# Patient Record
Sex: FEMALE | URBAN - METROPOLITAN AREA
[De-identification: names, ages, dates, MRNs, and addresses within clinical notes are randomized per-mention and may not be internally consistent; named-entity substitution may affect disease eponyms.]

---

## 2017-03-02 ENCOUNTER — IMPORTED ENCOUNTER (OUTPATIENT)
Dept: URBAN - METROPOLITAN AREA CLINIC 1 | Facility: CLINIC | Age: 57
End: 2017-03-02

## 2017-03-02 PROBLEM — H52.13: Noted: 2017-03-02

## 2017-03-02 PROCEDURE — S0621 ROUTINE OPHTHALMOLOGICAL EXA: HCPCS

## 2017-03-02 NOTE — PATIENT DISCUSSION
1. Myopia OU- Rx for glasses and CLs given 2. Glaucoma Suspect OU (CD 0.75/0.8) Past w/u negative. Return for OCT. 3. Cats OU- observe Return for an appointment in 4-6m 30 OCT with Dr. Adolfo Rodríguez.

## 2017-05-16 ENCOUNTER — OFFICE VISIT (OUTPATIENT)
Dept: VASCULAR SURGERY | Age: 57
End: 2017-05-16

## 2017-05-16 VITALS
DIASTOLIC BLOOD PRESSURE: 80 MMHG | HEIGHT: 70 IN | HEART RATE: 88 BPM | SYSTOLIC BLOOD PRESSURE: 144 MMHG | RESPIRATION RATE: 20 BRPM | BODY MASS INDEX: 40.94 KG/M2 | WEIGHT: 286 LBS

## 2017-05-16 DIAGNOSIS — I65.22 CAROTID STENOSIS, ASYMPTOMATIC, LEFT: Primary | ICD-10-CM

## 2017-05-16 DIAGNOSIS — I83.893 VARICOSE VEINS OF LOWER EXTREMITIES WITH OTHER COMPLICATIONS: ICD-10-CM

## 2017-05-17 NOTE — PROGRESS NOTES
Lino Young    Chief Complaint   Patient presents with    New Patient    Leg Pain       History and Physical    Ms Diamond Padilla is here not having been seen by our practice for nearly 7-8 years  But previously, she had bilateral GSV ablations and then scleortherapy done with us and had great results  But last week she started feeling pain in her right thigh area, both medial and lateral. There is local swelling and burning sensations as well  When she paid attention to these areas she noticed varicose veins at these locations. She is not sure if they had already been there - if so, they hadn't been bothersome.  But now noticing them and the associated symptoms, she wanted to be re-evaluated by us    I did explain that even though she has had prior treatments, vein problems can recur in other veins      Past Medical History:   Diagnosis Date    Anemia     Arthritis     Baker cyst     left    Carpal tunnel syndrome     left    Cholelithiasis     Chronic pain     Chronic tension headaches     Elevated cholesterol     Fatty liver     Fibromyalgia     Hemorrhoids     Hypothyroid     Insulin resistance     Liver disease     Remote tobacco use     quit 2010    Sleep apnea     Temporomandibular joint disorder     Thyroid disease     Varicose veins      Patient Active Problem List   Diagnosis Code    Insulin resistance E88.81    Dyslipidemia E78.5    Cardiovascular risk assessment Z91.89    Morbid obesity with BMI of 40.0-44.9, adult ([address] E66.01, Z68.41    Acquired hypothyroidism E03.9    Vitamin D deficiency E55.9    Cholelithiasis K80.20     Past Surgical History:   Procedure Laterality Date    HX CARPAL TUNNEL RELEASE  12-18-15    left    HX DILATION AND CURETTAGE      HX HYSTERECTOMY      2001    HX SALPINGO-OOPHORECTOMY      2012    HX THYROIDECTOMY      2002    HX TONSILLECTOMY      1992    HX TUBAL LIGATION      HX WISDOM TEETH EXTRACTION      x4     Current Outpatient Prescriptions Medication Sig Dispense Refill    Cholecalciferol, Vitamin D3, 5,000 unit/mL drop Take 5,000 Units by mouth daily. Allergies   Allergen Reactions    Propoxyphene-Acetaminophen Other (comments)     Pt felt \"loopy\" and skin crawlingn (\"wygesic\")    Gluten Other (comments)    Lactose Other (comments)     Social History     Social History    Marital status: SINGLE     Spouse name: N/A    Number of children: N/A    Years of education: N/A     Occupational History    Not on file.      Social History Main Topics    Smoking status: Former Smoker     Packs/day: 1.00     Years: 10.00     Types: Cigarettes     Quit date: 12/31/2011    Smokeless tobacco: Never Used    Alcohol use 0.0 oz/week     0 Standard drinks or equivalent per week      Comment: social    Drug use: Yes     Special: Prescription, OTC    Sexual activity: Not on file     Other Topics Concern    Not on file     Social History Narrative      Family History   Problem Relation Age of Onset    Cancer Mother      breast,     Heart Disease Father     Hypertension Father     Elevated Lipids Father     Kidney Disease Brother     Obesity Brother     Arthritis-rheumatoid Maternal Grandmother     Cancer Maternal Grandmother      colon    No Known Problems Maternal Grandfather     No Known Problems Paternal Grandmother     Heart Disease Paternal Grandfather     Heart Attack Paternal Grandfather     No Known Problems Brother     No Known Problems Son     Heart Disease Other     Hypertension Other     Thyroid Disease Other     Diabetes Other        Review of Systems    Review of Systems - History obtained from the patient  General ROS: negative  Psychological ROS: negative  Ophthalmic ROS: negative  Respiratory ROS: negative  Cardiovascular ROS: negative  Gastrointestinal ROS: negative  Musculoskeletal ROS: negative  Neurological ROS: negative  Dermatological ROS: negative  Vascular ROS: per HPI        Physical Exam:    Visit Vitals    /80 (BP 1 Location: Left arm, BP Patient Position: Sitting)    Pulse 88    Resp 20    Ht 5' 10\" (1.778 m)    Wt 286 lb (129.7 kg)    BMI 41.04 kg/m2      General:  Alert, cooperative, no distress. Head:  Normocephalic, without obvious abnormality, atraumatic. Eyes:    Conjunctivae/corneas clear. Pupils equal, round, reactive to light. Extraocular movements intact. Neck:         No bruits   Lungs:   Clear to auscultation bilaterally. Heart:  Regular rate and rhythm, S1, S2 normal   Extremities: No significant edema noted. But right upper medial and lateral thigh with approx 3mm ropey varicosities, nonphlebitic. Also scattered spider veins in these areas. Pulses: 2+ and symmetric all extremities. Skin: Skin color, texture, turgor normal. No rashes or lesions. Impression and Plan:  1. Carotid stenosis, asymptomatic, left    2. Varicose veins of lower extremities with other complications      Orders Placed This Encounter    DUPLEX LOWER EXT VENOUS RIGHT AMB (Reflux)    DUPLEX CAROTID BILATERAL AMB     Will get repeat reflux study to follow these veins for any other ablation options. But I do wonder most likely that we may offer further sclerotherapy, or perhaps a newer surgical procedure, trivex  Meanwhile, I reminded her to make and effort to start wearing her compression stockings again. She may also try using topical preparation H or horse chestnut seed extract for symptom relief    She also mentions a few years ago getting a life screen and told she had some blockage in one of her carotid arteries. She is concerned because of her family history of circulation problems. We can go ahead and repeat a carotid duplex as well.   We will contact her with results and recommendations for follow up/treatment options to offer based on reflux study  Focus is currently on right leg, as left is asymptomatic    KEVYN Enriquez    Portions of this note have been created using voice recognition software.

## 2017-05-23 ENCOUNTER — OFFICE VISIT (OUTPATIENT)
Dept: VASCULAR SURGERY | Age: 57
End: 2017-05-23

## 2017-05-23 DIAGNOSIS — I65.22 CAROTID STENOSIS, ASYMPTOMATIC, LEFT: ICD-10-CM

## 2017-05-23 DIAGNOSIS — I83.893 VARICOSE VEINS OF LOWER EXTREMITIES WITH OTHER COMPLICATIONS: ICD-10-CM

## 2017-05-23 NOTE — PROCEDURES
[facility] Vein   *** FINAL REPORT ***    Name: Lela Mcgill  MRN: [id number]       Outpatient  : 1960  HIS Order #: [id number]  [id number] [facility] Visit #: [id number]  Date: 23 May 2017    TYPE OF TEST: Cerebrovascular Duplex    REASON FOR TEST  Carotid disease    Right Carotid:-             Proximal               Mid                 Distal  cm/s  Systolic  Diastolic  Systolic  Diastolic  Systolic  Diastolic  CCA:     23.4      22.0                            83.0      22.0  Bulb:  ECA:     97.0      16.0  ICA:     79.0      17.0       86.0      29.0       69.0      23.0  ICA/CCA:  1.0       1.3    ICA Stenosis: Normal    Right Vertebral:-  Finding: Antegrade  Sys:       36.0  Oriana:       13.0    Right Subclavian:    Left Carotid:-            Proximal                Mid                 Distal  cm/s  Systolic  Diastolic  Systolic  Diastolic  Systolic  Diastolic  CCA:    397.0      33.0                            82.0      27.0  Bulb:  ECA:     68.0      16.0  ICA:     53.0      16.0       64.0      26.0       67.0      26.0  ICA/CCA:  0.6       0.8    ICA Stenosis: <50%    Left Vertebral:-  Finding: Antegrade  Sys:       40.0  Oriana:        9.0    Left Subclavian:    INTERPRETATION/FINDINGS  Duplex images were obtained using 2-D gray scale, color flow and  spectral doppler analysis. 1. No significant stenosis of the right internal carotid artery. 2. Mild <50% stenosis in the left internal carotid artery. 3. No significant stenosis in the external carotid arteries  bilaterally. 4. Antegrade flow in both vertebral arteries. Plaque Morphology:  Heterogeneous plaque in the bulb and left ICA. No prior to compare. ADDITIONAL COMMENTS    I have personally reviewed the data relevant to the interpretation of  this  study. TECHNOLOGIST: Savita Smith RDMS  Signed: 2017 11:19 AM    PHYSICIAN: Jeffrey Phillips D.O.   Signed: 2017 02:10 PM

## 2017-05-23 NOTE — PROCEDURES
[facility] Vein   *** FINAL REPORT ***    Name: Mildred Macias  MRN: [id number]       Outpatient  : 1960  HIS Order #: [id number]  [id number] [facility] Visit #: [id number]  Date: 23 May 2017    TYPE OF TEST: Peripheral Venous Testing    REASON FOR TEST  Varicose veins, Limb Pain    Right Leg:-  Deep venous thrombosis:           No  Superficial venous thrombosis:    Not examined  Deep venous insufficiency:        Yes  Superficial venous insufficiency: Yes    Abnormal Valve Closure Times (seconds):    Right Common Femoral: 0.9    Right SFJ:            0.6    Right GSV proximal:   3.5    Right GSV distal:     5.0    Vein Mapping:    Diam.   Depth  (mm)    Right Great Saphenous Vein:    High Thigh:    Mid Thigh:    Low Thigh:    Knee:    High Calf:    Low Calf: Ankle:    Right Small Saphenous Vein:    SPJ:    Mid Calf: Ankle:    Giacomini:  Accessory saph.:  Schaffer :  Grace :      INTERPRETATION/FINDINGS  Duplex images were obtained using 2-D gray scale, color flow and  spectral doppler analysis. Right leg Reflux Exam:  1. No evidence of deep venous thrombosis detected in the common  femoral, femoral, deep femoral, popliteal, posterior tibial or  peroneal veins visualized. 2. Incompetent deep venous system with reflux involving the common  femoral vein only. 3. Incompetent great saphenous vein with reflux in the sapheno-femoral   junction, high upper thigh before the vein obliterates from known  history of ablation in  in the mid to distal thigh, Classification   T0.  4. The GSV is patent at the above knee level with reflux at the above  knee, below knee and calf levels. 5. Incompetent tributary off the GSV measuring 4.3mm with 4.1 seconds  of reflux at the above knee level with multiple varices noted. 6. Small saphenous vein is patent and competent without evidence of  thrombus or reflux. 7. Patent contralateral femoral vein with venous insufficiency  measuring 2.2 seconds.  The GSV at the [facility] is patent without reflux. 8. Multiphasic tibial doppler signal on the right at rest.    ADDITIONAL COMMENTS    I have personally reviewed the data relevant to the interpretation of  this  study. TECHNOLOGIST: Stephanie Carter RDMS  Signed: 05/23/2017 11:31 AM    PHYSICIAN: Radha Hernandez D.O.   Signed: 05/23/2017 02:10 PM

## 2017-05-24 ENCOUNTER — DOCUMENTATION ONLY (OUTPATIENT)
Dept: VASCULAR SURGERY | Age: 57
End: 2017-05-24

## 2017-05-24 NOTE — PROGRESS NOTES
Called pt with results of studies  She had screening carotid a few years ago with noted blockage. We repeated and she does have some mild plaque of LICA but no significant stenosis. DIVINA HERNANDEZ    She has had bilateral GSV ablations and then scleortherapy done with us and had great results previously  But she recently started feeling pain in her right thigh area, both medial and lateral. There is local swelling and burning sensations as well  This correlates to appearance of varicose veins at these locations    Vein study showed incompetent great saphenous vein with reflux in the sapheno-femoral junction, high upper thigh before the vein obliterates from known history of ablation in 2009 in the mid to distal thigh. The GSV is patent at the above knee level with reflux at the above knee, below knee and calf levels. Incompetent tributary off the GSV measuring 4.3mm with 4.1 seconds  of reflux at the above knee level with multiple varices noted    This corresponds to her exam as well  Of note, she had stockings from prior procedure. She attempted to wear. However, due to size of her legs, stockings ineffective in compressing areas of concern and actually aggravating rather than helping relieve symptoms. She has also taken NSAIDs intermittently, but minimal relief of discomfort and it does not help the swelling and burning    I reviewed the results/worksheet with Dr Yina Kahn prior to calling patient  He advised, and we had even discussed this at her visit, about doing sclerotherapy. I again reviewed the procedure for injections and she would like to proceed. We will coordinate making arrangements to have her come in.  We may need to plan for 2-3 sessions

## 2017-05-30 ENCOUNTER — TELEPHONE (OUTPATIENT)
Dept: VASCULAR SURGERY | Age: 57
End: 2017-05-30

## 2017-05-31 NOTE — TELEPHONE ENCOUNTER
Known varicose veins, which we are trying to schedule for injections, but has a persistent area in back of leg she isnt' sure if is a vein or not  Could be phlebitis of a varicosity  She will come in tomorrow for us to evaluate and then discuss further planning at that point regarding injections

## 2017-06-01 ENCOUNTER — OFFICE VISIT (OUTPATIENT)
Dept: VASCULAR SURGERY | Age: 57
End: 2017-06-01

## 2017-06-01 VITALS
HEART RATE: 68 BPM | WEIGHT: 286 LBS | BODY MASS INDEX: 40.94 KG/M2 | SYSTOLIC BLOOD PRESSURE: 140 MMHG | RESPIRATION RATE: 20 BRPM | HEIGHT: 70 IN | DIASTOLIC BLOOD PRESSURE: 82 MMHG

## 2017-06-01 DIAGNOSIS — I83.811 VARICOSE VEINS OF RIGHT LOWER EXTREMITIES WITH PAIN: Primary | ICD-10-CM

## 2017-06-01 NOTE — PROGRESS NOTES
Ms Tahmina Bailey is here for concern of a vein on her leg  We saw her and called with results of recent reflux study. She had prior ablation and sclerotherapy  But had come with concern of new varicosities  Her closure was fine but she had some branch varicosities noted with reflux, which correlated to areas of her exam  We recommended follow up sclero  However, she is concerned with a persistent area of tenderness she wanted rechecked    This is at the medial lower thigh area. I do not appreciate any phlebitis, but this is all what appears to be areas of the varicosities initially assessed and seen on duplex.  There is an associated valve site of 4.1 sec of reflux, and this could greatly contribute here to this area of discomfort  Encouraged use of stockings and topically can do the horse chestnut cream or preparation H and then go ahead and arrange for the sclero in hopes of relief soon

## 2017-06-30 ENCOUNTER — OFFICE VISIT (OUTPATIENT)
Dept: VASCULAR SURGERY | Age: 57
End: 2017-06-30

## 2017-06-30 VITALS — WEIGHT: 286 LBS | HEIGHT: 70 IN | BODY MASS INDEX: 40.94 KG/M2

## 2017-06-30 DIAGNOSIS — I83.893 VARICOSE VEINS OF LOWER EXTREMITIES WITH OTHER COMPLICATIONS: Primary | ICD-10-CM

## 2017-06-30 NOTE — PROGRESS NOTES
Pt came with persistent and sore know right inner thigh  Had dr Cinthia Hallman come in   Was going to attempt sclerotherapy but area is somewhat deep so will reschedule her to come back to be able to do with ultrasound guidance

## 2017-08-08 ENCOUNTER — OFFICE VISIT (OUTPATIENT)
Dept: VASCULAR SURGERY | Age: 57
End: 2017-08-08

## 2017-08-08 VITALS
SYSTOLIC BLOOD PRESSURE: 142 MMHG | WEIGHT: 286 LBS | RESPIRATION RATE: 12 BRPM | HEART RATE: 79 BPM | DIASTOLIC BLOOD PRESSURE: 88 MMHG | HEIGHT: 70 IN | BODY MASS INDEX: 40.94 KG/M2

## 2017-08-08 DIAGNOSIS — I83.893 VARICOSE VEINS OF LOWER EXTREMITIES WITH OTHER COMPLICATIONS: Primary | ICD-10-CM

## 2017-08-08 NOTE — MR AVS SNAPSHOT
Visit Information Date & Time Provider Department Dept. Phone Encounter #  
 8/8/2017  9:30 AM MD [facility] [phone] [id number] Follow-up Instructions Return in about 6 weeks (around 9/19/2017). Your Appointments 9/5/2017  1:00 PM  
INJECTION with MD [facility]) Appt Note: 3RD INJECTION RIGHT LEG; .; DO INJECTION WITH ULTRASOUND; .  
 [phone] [facility] [address]  
[phone] [address] [facility] [address] [facility]  
  
    
 9/29/2017  9:30 AM  
INJECTION with MD [facility]) Appt Note: 1st INJECTION RIGHT LEG; DO INECTION WITH ULTRASOUND; pt r/s date [address] [facility] [address] [facility] [address]  
[phone] Upcoming Health Maintenance Date Due Hepatitis C Screening 1960 DTaP/Tdap/Td series (1 - Tdap) 2/16/1983 BREAST CANCER SCRN MAMMOGRAM 2/16/2012 FOBT Q 1 YEAR AGE 50-75 2/16/2012 INFLUENZA AGE 9 TO ADULT 8/1/2017 PAP AKA CERVICAL CYTOLOGY 11/26/2017 Allergies as of 8/8/2017  Review Complete On: 8/8/2017 By: Elin Ceron MD  
  
 Severity Noted Reaction Type Reactions Propoxyphene-acetaminophen High 02/24/2016    Other (comments) Pt felt \"loopy\" and skin crawlingn (\"wygesic\") Gluten Low 02/24/2016    Other (comments) Lactose Low 02/24/2016    Other (comments) Current Immunizations  Never Reviewed No immunizations on file. Not reviewed this visit You Were Diagnosed With   
  
 Codes Comments Varicose veins of lower extremities with other complications    -  Primary ICD-10-CM: N06.481 ICD-9-CM: 454.8 Vitals  BP Pulse Resp Height(growth percentile) Weight(growth percentile) BMI  
 142/88 (BP 1 Location: Left arm, BP Patient Position: Sitting) 79 12 5' 10\" (1.778 m) 286 lb (129.7 kg) 41.04 kg/m2 OB Status Smoking Status Hysterectomy Former Smoker Vitals History BMI and BSA Data Body Mass Index Body Surface Area 41.04 kg/m 2 2.53 m 2 Preferred Pharmacy Pharmacy Name Phone [facility] [address] [facility] AT [facility] [phone] Your Updated Medication List  
  
   
This list is accurate as of: 8/8/17 10:32 AM.  Always use your most recent med list.  
  
  
  
  
 Cholecalciferol (Vitamin D3) 5,000 unit/mL Drop Take 5,000 Units by mouth daily. NATURE-THROID 146.25 mg Tab Generic drug:  thyroid (pork) Take  by mouth. Follow-up Instructions Return in about 6 weeks (around 9/19/2017). Please provide this summary of care documentation to your next provider. Your primary care clinician is listed as POLA ROSAS. If you have any questions after today's visit, please call [phone].

## 2017-08-08 NOTE — PROGRESS NOTES
Most pleasant 61-year-old female here today for evaluation of varicose veins. She has had a closure procedure in the past which is done very nicely with. She has had some intermittent pain and swollen veins on the inner thigh and posterior leg right side. This is gone away for her. She is tells me this pain has disappeared and her leg feels much better. She does have a cluster of large varicose veins on the inner thigh and anterior. She is planning a beach week next week she was to come back for further evaluation possible injection sclerotherapy. I did evaluated the site there is a cluster of branch varicosities that are super dilated and would respond well to injection sclerotherapy. Certainly if these are painful return with symptoms be happy to do the sclera at the sites.   She will maximize compression and medical therapy at this point

## 2017-08-14 ENCOUNTER — IMPORTED ENCOUNTER (OUTPATIENT)
Dept: URBAN - METROPOLITAN AREA CLINIC 1 | Facility: CLINIC | Age: 57
End: 2017-08-14

## 2017-08-14 PROBLEM — H25.813: Noted: 2017-08-14

## 2017-08-14 PROBLEM — H40.013: Noted: 2017-08-14

## 2017-08-14 PROCEDURE — 92133 CPTRZD OPH DX IMG PST SGM ON: CPT

## 2017-08-14 PROCEDURE — 92014 COMPRE OPH EXAM EST PT 1/>: CPT

## 2017-08-14 NOTE — PATIENT DISCUSSION
1.  Glaucoma Suspect OU : (CD 0.75/0.8) Neg Fm Hx. Risk of cupping. OCT WNL OU today. Continue to observe on no meds. Patient is considered Low Risk. 2.  Cataract OU: Observe for now without intervention. The patient was advised to contact us if any change or worsening of visionWill follow annually on [address] unless further changes. Return for an appointment in March 40/cc with Dr. Geraldine Jain.

## 2017-09-15 ENCOUNTER — HOSPITAL ENCOUNTER (OUTPATIENT)
Dept: BONE DENSITY | Age: 57
Discharge: HOME | End: 2017-09-15
Attending: INTERNAL MEDICINE
Payer: COMMERCIAL

## 2017-09-15 DIAGNOSIS — Z78.0 POSTMENOPAUSAL: ICD-10-CM

## 2017-09-15 PROCEDURE — 77080 DXA BONE DENSITY AXIAL: CPT

## 2017-12-29 ENCOUNTER — APPOINTMENT (OUTPATIENT)
Dept: PHYSICAL THERAPY | Age: 57
End: 2017-12-29

## 2018-01-09 ENCOUNTER — HOSPITAL ENCOUNTER (OUTPATIENT)
Dept: PHYSICAL THERAPY | Age: 58
End: 2018-01-09

## 2018-04-23 ENCOUNTER — IMPORTED ENCOUNTER (OUTPATIENT)
Dept: URBAN - METROPOLITAN AREA CLINIC 1 | Facility: CLINIC | Age: 58
End: 2018-04-23

## 2018-04-23 PROBLEM — H52.13: Noted: 2018-04-23

## 2018-04-23 PROCEDURE — S0621 ROUTINE OPHTHALMOLOGICAL EXA: HCPCS

## 2018-04-23 NOTE — PATIENT DISCUSSION
1. Myopia OU- CL Rx/ MRx for glasses given. 2.  Glaucoma Suspect OU : (CD 0.75/0.8) Neg Fm Hx. Risk of cupping. Past w/u negative. Continue to observe on no meds. Patient is considered Low Risk. 3.  Cataract OU: Observe for now without intervention. The patient was advised to contact us if any change or worsening of visionReturn for an appointment in 1 yr 40/cc with Dr. Derek Jaffe.

## 2018-05-23 ENCOUNTER — TELEPHONE (OUTPATIENT)
Dept: VASCULAR SURGERY | Age: 58
End: 2018-05-23

## 2018-05-23 NOTE — TELEPHONE ENCOUNTER
Patient called asking to be referred to [facility] vein specialists. I advised patient that referral must come from her PCP, but we would be glad to send over any notes or studies they may need.

## 2018-06-26 ENCOUNTER — HOSPITAL ENCOUNTER (OUTPATIENT)
Dept: PHYSICAL THERAPY | Age: 58
Discharge: HOME | End: 2018-06-26
Payer: COMMERCIAL

## 2018-06-26 PROCEDURE — 97161 PT EVAL LOW COMPLEX 20 MIN: CPT | Performed by: PHYSICAL THERAPIST

## 2018-06-26 NOTE — PROGRESS NOTES
.[address] PHYSICAL THERAPY  [address] [place] [address] [address], [address],[facility], [address] [facility] - Phone: [phone]  Fax: [phone]  PLAN OF CARE / [address] [facility]  Patient Name: Archie Gan : 1960   Medical   Diagnosis: R hip OA, GT bursitis Treatment Diagnosis: Right hip pain [M25.551]   Onset Date:      Referral Source: Governor Neighbours, DO Start of Care [facility]): 2018   Prior Hospitalization: See medical history Provider #: [id number]   Prior Level of Function: Sedentary but no limitation related to R hip   Comorbidities: FMS, arthritis, HTN, scoliosis, obesity, dyslipidemia   Medications: Verified on Patient Summary List   The Plan of Care and following information is based on the information from the initial evaluation.   ===========================================================================================  Assessment / key information:  66 F arrives to clinic with approximately 6 month c/o of posterior lateral hip pain that radiates to groin. Reports insidious but progressive onset limiting her transfers (sit<>stand) and walking distance with max pain 8/10. Denies change with recent bursae injection. She does report proximal leg edema that is ttp - she is pending vascular consult next week. No recent imaging or oral medicines  Objective findings: ls arom wnl all directions but R sided pain with end range rotations, standing noted anterior R inn, L post and R sacral rotation, -slump, SLR +SI cluster test compression, distraction and DESTINY on R, hip flex limited 100 degrees but pt reports \"swelling in leg\" vs groin pain, poor glute max, med, transverse abdominal and multifidi activation with multiple compensations noted. ttp throughout R posterior hip, tfl and RF. Palpation here vs GT reproduced pt chief pain c/o. Will attempt PT in order to address problem list below. ===========================================================================================  Eval Complexity: History MEDIUM  Complexity : 1-2 comorbidities / personal factors will impact the outcome/ POC ;  Examination  HIGH Complexity : 4+ Standardized tests and measures addressing body structure, function, activity limitation and / or participation in recreation ; Presentation LOW Complexity : Stable, uncomplicated ;  Decision Making MEDIUM Complexity : FOTO score of 26-74; Overall Complexity LOW   Problem List: pain affecting function, decrease ROM, decrease strength, impaired gait/ balance, decrease ADL/ functional abilitiies, decrease activity tolerance, decrease flexibility/ joint mobility and decrease transfer abilities   Treatment Plan may include any combination of the following: Therapeutic exercise, Therapeutic activities, Neuromuscular re-education, Physical agent/modality, Gait/balance training, Manual therapy, Patient education, Self Care training and Functional mobility training  Patient / Family readiness to learn indicated by: asking questions, trying to perform skills and interest  Persons(s) to be included in education: patient (P)  Barriers to Learning/Limitations: None  Measures taken:    Patient Goal (s): Decrease pain, return to exercise   Patient self reported health status: good  Rehabilitation Potential: fair   Short Term Goals: To be accomplished in  2  weeks:  1. Pt will be compliant with hep  2. Pt will be able to perform >/=10\" bridges to improve glute strength for transfers  3. Pt will note daily max pain </=6/10 in order to increase participation in  [address]: To be accomplished in  4  weeks:  1. Pt will demo > 1 week symmetrical sij  2. Pt will note daily max pain </=3/10 in order to increase participation in adls  3.  Pt will increase FOTO by 9 points in order to show functional improvement   Frequency / Duration:   Patient to be seen  2  times per week for 4 weeks:  Patient / Caregiver education and instruction: self care and activity modification  G-Codes (GP): heriberot  Therapist Signature: Bethel Smith, PT Date: 5/80/6270   Certification Period: na Time: 6:41 PM   ===========================================================================================  I certify that the above Physical Therapy Services are being furnished while the patient is under my care. I agree with the treatment plan and certify that this therapy is necessary. Physician Signature:        Date:       Time:     Please sign and return to [facility] [place], [facility]. or you may fax the signed copy to [phone]. Thank you.

## 2018-06-26 NOTE — PROGRESS NOTES
Evert Abdul PHYSICAL THERAPY - DAILY TREATMENT NOTE    Patient Name: Noam Stahl        Date: 2018  : 1960   yes Patient  Verified  Visit #:     Insurance: Payor: Chrissy Huff / Plan: [facility] PPO / Product Type: PPO /      In time: 600 Out time: 635   Total Treatment Time: 35     Medicare/[facility] Time Tracking (below)   Total Timed Codes (min):  na 1:1 Treatment Time:  na     TREATMENT AREA =  Right hip pain [M25.551]    SUBJECTIVE  Pain Level (on 0 to 10 scale):  3  / 10   Medication Changes/New allergies or changes in medical history, any new surgeries or procedures?    no  If yes, update Summary List   Subjective Functional Status/Changes:  []  No changes reported     See ie          OBJECTIVE     min Patient Education:  yes  Reviewed HEP   []  Progressed/Changed HEP based on: Other Objective/Functional Measures:    Hep activity modification, sleeping positions and transfer training. Demo verbal understanding  See ie     Post Treatment Pain Level (on 0 to 10) scale:   3  / 10     ASSESSMENT  Assessment/Changes in Function:     See ie     []  See Progress Note/Recertification   Patient will continue to benefit from skilled PT services to modify and progress therapeutic interventions, address functional mobility deficits, address ROM deficits, address strength deficits, analyze and address soft tissue restrictions, analyze and cue movement patterns, analyze and modify body mechanics/ergonomics, assess and modify postural abnormalities and instruct in home and community integration to attain remaining goals. Progress toward goals / Updated goals:    See ie     PLAN  []  Upgrade activities as tolerated yes Continue plan of care   []  Discharge due to :    []  Other:      Therapist: Khadra Ny PT    Date: 2018 Time: 6:39 PM     No future appointments.

## 2018-07-09 ENCOUNTER — HOSPITAL ENCOUNTER (OUTPATIENT)
Dept: PHYSICAL THERAPY | Age: 58
Discharge: HOME | End: 2018-07-09
Payer: COMMERCIAL

## 2018-07-09 PROCEDURE — 97140 MANUAL THERAPY 1/> REGIONS: CPT

## 2018-07-09 PROCEDURE — 97110 THERAPEUTIC EXERCISES: CPT

## 2018-07-09 NOTE — PROGRESS NOTES
PHYSICAL THERAPY - DAILY TREATMENT NOTE    Patient Name: Bam Hicks        Date: 2018  : 1960   yes Patient  Verified  Visit #:      of     Insurance: Payor: BLUE CROSS / Plan: [facility] PPO / Product Type: PPO /      In time: 5:34 Out time: 6:22   Total Treatment Time: 48     Medicare/[employer] Time Tracking (below)   Total Timed Codes (min):  38 1:1 Treatment Time:  38     TREATMENT AREA =  Right hip pain [M25.551]    SUBJECTIVE  Pain Level (on 0 to 10 scale):  3  / 10   Medication Changes/New allergies or changes in medical history, any new surgeries or procedures?    no  If yes, update Summary List   Subjective Functional Status/Changes:  []  No changes reported     Pt reports she was on vacation last week and had to make two long drives. Reports her hip/leg did not feel too bad on the drive but she has noticed increased in cramping of the R lateral thigh and calf. Reports she takes a magnesium supplement nightly and is trying to increase her water intake. Reports minimal compliance to HEP due to vacation. Vascular study pending           OBJECTIVE  Modalities Rationale:     decrease inflammation and decrease pain to improve patient's ability to ambulate   min [] Estim, type/location:                                      []  att     []  unatt     []  w/US     []  w/ice    []  w/heat    min []  Mechanical Traction: type/lbs                   []  pro   []  sup   []  int   []  cont    []  before manual    []  after manual    min []  Ultrasound, settings/location:      min []  Iontophoresis w/ dexamethasone, location:                                               []  take home patch       []  in clinic   10 min [x]  Ice     []  Heat    location/position:  To the R hip in L SL    min []  Vasopneumatic Device, press/temp:     min []  Other:    [] Skin assessment post-treatment (if applicable):    []  intact    []  redness- no adverse reaction     []redness  adverse reaction: 28 min Therapeutic Exercise:  [x]  See flow sheet   Rationale:      increase ROM and increase strength to improve the patients ability to ambulate    10 min Manual Therapy: stm to the R piriformis, glute med, TFL, SL hip flexor str   Rationale:      decrease pain, increase ROM, increase tissue extensibility and decrease trigger points to improve patient's ability to transfer sit to stand     min Patient Education:  yes  Reviewed HEP   []  Progressed/Changed HEP based on: Other Objective/Functional Measures:    Pt c/o pain lateral hip w/ SL clamshells; unable to perform SL hip abd (modifiy to stand at NV)  Cramping in the L l/s when performing R prone hip ext, added pillow under hips and emphasized glute set prior to lift, reducing back cramp  Significant ttp to the R TFL, piri, and glute med     Post Treatment Pain Level (on 0 to 10) scale:   0  / 10     ASSESSMENT  Assessment/Changes in Function:     Pt reported no pain post tx session. Discussed likelihood of delayed onset muscle soreness and advised use of ice and stretching to reduce. Pt acknowledged understanding     []  See Progress Note/Recertification   Patient will continue to benefit from skilled PT services to modify and progress therapeutic interventions, address functional mobility deficits, address ROM deficits, address strength deficits, analyze and address soft tissue restrictions, analyze and cue movement patterns, analyze and modify body mechanics/ergonomics, assess and modify postural abnormalities, address imbalance/dizziness and instruct in home and community integration to attain remaining goals.    Progress toward goals / Updated goals:    No significant progress towards goals thus far     PLAN  []  Upgrade activities as tolerated yes Continue plan of care   []  Discharge due to :    []  Other:      Therapist: Isaiah [facility], PT    Date: 7/9/2018 Time: 5:40 PM     Future Appointments  Date Time Provider Amanda Ahn 7/11/2018 5:30 PM Reta Castro, PT [facility]   7/16/2018 5:30 PM Lydia Aggarwal, PT [facility]   7/18/2018 5:30 PM Lydia Aggarwal, PT [facility]   7/23/2018 5:30 PM Reta Castro, PT [facility]   7/25/2018 5:30 PM Reta Castro, PT [facility]   7/30/2018 5:30 PM Lydia Aggarwal, PT [facility]   8/2/2018 5:30 PM [address] [facility]   8/7/2018 5:30  [facility]   8/9/2018 5:30  [facility]   8/14/2018 5:30  [facility]   8/16/2018 5:30 PM [address] [facility]

## 2018-07-11 ENCOUNTER — HOSPITAL ENCOUNTER (OUTPATIENT)
Dept: PHYSICAL THERAPY | Age: 58
Discharge: HOME | End: 2018-07-11
Payer: COMMERCIAL

## 2018-07-11 PROCEDURE — 97110 THERAPEUTIC EXERCISES: CPT

## 2018-07-11 PROCEDURE — 97140 MANUAL THERAPY 1/> REGIONS: CPT

## 2018-07-11 NOTE — PROGRESS NOTES
PHYSICAL THERAPY - DAILY TREATMENT NOTE    Patient Name: Meghana Bruno        Date: 2018  : 1960   yes Patient  Verified  Visit #:   3   of   12  Insurance: Payor: Ezio Kennedy / Plan: [facility] PPO / Product Type: PPO /      In time: 5:29 Out time: 6:24   Total Treatment Time: 55     Medicare/[employer] Time Tracking (below)   Total Timed Codes (min):  45 1:1 Treatment Time:  45     TREATMENT AREA =  Right hip pain [M25.551]    SUBJECTIVE  Pain Level (on 0 to 10 scale):  3  / 10   Medication Changes/New allergies or changes in medical history, any new surgeries or procedures?    no  If yes, update Summary List   Subjective Functional Status/Changes:  []  No changes reported     Pt reports soreness post tx session last visit but nothing out of the ordinary for her. OBJECTIVE  Modalities Rationale:     decrease inflammation and decrease pain to improve patient's ability to ambulate   min [] Estim, type/location:                                      []  att     []  unatt     []  w/US     []  w/ice    []  w/heat    min []  Mechanical Traction: type/lbs                   []  pro   []  sup   []  int   []  cont    []  before manual    []  after manual    min []  Ultrasound, settings/location:      min []  Iontophoresis w/ dexamethasone, location:                                               []  take home patch       []  in clinic   10 min [x]  Ice     []  Heat    location/position:  To the R hip in L SL    min []  Vasopneumatic Device, press/temp:     min []  Other:    [x] Skin assessment post-treatment (if applicable):    [x]  intact    []  redness- no adverse reaction     []redness  adverse reaction:        30 min Therapeutic Exercise:  [x]  See flow sheet   Rationale:      increase ROM and increase strength to improve the patients ability to ambulate, stand     15 min Manual Therapy: stm to R glute med, TFL, piriformis, prox RF, PROM hip ER, abd, sup HS str   Rationale:      decrease pain, increase ROM, increase tissue extensibility and decrease trigger points to improve patient's ability to transfer     min Patient Education:  yes  Reviewed HEP   []  Progressed/Changed HEP based on: Other Objective/Functional Measures:    Cont w/ sig ttp to the R glute med, piri, and TFL w/ (+) jump sign to manual therapy  C/o tension R groin and inner thigh w/ adductor str/prom hip abd  Cramping reported to R HS w/ supine bridge; cued for glute contraction which reduced cramp     Post Treatment Pain Level (on 0 to 10) scale:   3  / 10     ASSESSMENT  Assessment/Changes in Function:     Established HEP this visit to include core/hip strengthening and hip stretching. Pt demo I w/ exercises     []  See Progress Note/Recertification   Patient will continue to benefit from skilled PT services to modify and progress therapeutic interventions, address functional mobility deficits, address ROM deficits, address strength deficits, analyze and address soft tissue restrictions, analyze and cue movement patterns, analyze and modify body mechanics/ergonomics and instruct in home and community integration to attain remaining goals.    Progress toward goals / Updated goals:    Established HEP, progress to STG #1     PLAN  []  Upgrade activities as tolerated yes Continue plan of care   []  Discharge due to :    []  Other:      Therapist: Adilene Velasco PT    Date: 7/11/2018 Time: 5:56 PM     Future Appointments  Date Time Provider Amanda Ahn   7/16/2018 5:30 PM Adilene Velasco PT [facility]   7/18/2018 5:30 PM Adilene Velasco PT [facility]   7/23/2018 5:30 PM Adilene Velasco PT [facility]   7/25/2018 5:30 PM Adilene Velasco PT [facility]   7/30/2018 5:30 PM Adilene Velasco PT [facility]   8/2/2018 5:30 PM Theo [facility]   8/7/2018 5:30 PM Theo [facility]   8/9/2018 5:30 PM Theo [facility]   8/14/2018 5:30 PM Theo Gut [facility]   8/16/2018 5:30 PM Merlene Ham Granda [facility]

## 2018-07-16 ENCOUNTER — HOSPITAL ENCOUNTER (OUTPATIENT)
Dept: PHYSICAL THERAPY | Age: 58
Discharge: HOME | End: 2018-07-16
Payer: COMMERCIAL

## 2018-07-16 PROCEDURE — 97140 MANUAL THERAPY 1/> REGIONS: CPT

## 2018-07-16 PROCEDURE — 97110 THERAPEUTIC EXERCISES: CPT

## 2018-07-16 NOTE — PROGRESS NOTES
PHYSICAL THERAPY - DAILY TREATMENT NOTE    Patient Name: Paul Cook        Date: 2018  : 1960   yes Patient  Verified  Visit #:      12  Insurance: Payor: Fiordaliza Vick / Plan: [facility] PPO / Product Type: PPO /      In time: 5:33 Out time: 6:28   Total Treatment Time: 55     Medicare/[facility] Time Tracking (below)   Total Timed Codes (min):  45 1:1 Treatment Time:  45     TREATMENT AREA =  Right hip pain [M25.551]    SUBJECTIVE  Pain Level (on 0 to 10 scale):  3  / 10   Medication Changes/New allergies or changes in medical history, any new surgeries or procedures?    no  If yes, update Summary List   Subjective Functional Status/Changes:  []  No changes reported     Pt reports no significant change in sx thus far. Reports mornings are always the worst, at 4-5/10 pain level. Notes venous study will be done on 18. OBJECTIVE  Modalities Rationale:     decrease inflammation and decrease pain to improve patient's ability to ambulate   min [] Estim, type/location:                                      []  att     []  unatt     []  w/US     []  w/ice    []  w/heat    min []  Mechanical Traction: type/lbs                   []  pro   []  sup   []  int   []  cont    []  before manual    []  after manual    min []  Ultrasound, settings/location:      min []  Iontophoresis w/ dexamethasone, location:                                               []  take home patch       []  in clinic   10 min [x]  Ice     []  Heat    location/position:  To the R hip in supine w/ bolster    min []  Vasopneumatic Device, press/temp:     min []  Other:    [x] Skin assessment post-treatment (if applicable):    [x]  intact    []  redness- no adverse reaction     []redness  adverse reaction:        25 min Therapeutic Exercise:  [x]  See flow sheet   Rationale:      increase ROM, increase strength, improve balance and increase proprioception to improve the patients ability to ambulate     20 min Manual Therapy: stm to R glute med, TFL, prox RF, long axis distraction to R hip, shotgun tech   Rationale:      decrease pain, increase ROM, increase tissue extensibility and decrease trigger points to improve patient's ability to complete transfers, prolonged standing     min Patient Education:  yes  Reviewed HEP   []  Progressed/Changed HEP based on: Other Objective/Functional Measures:    Pt reports significant relief w/ long axis distraction  Following shotgun tech for pubic clearing, pt ambulated w/ dec pain and reported feeling more \"level\"  Cont w/ significant ttp to the R TFL     Post Treatment Pain Level (on 0 to 10) scale:   3  / 10     ASSESSMENT  Assessment/Changes in Function:     Pt reported no change in sx post tx session despite reporting improvement following manual therapy     []  See Progress Note/Recertification   Patient will continue to benefit from skilled PT services to modify and progress therapeutic interventions, address functional mobility deficits, address ROM deficits, address strength deficits, analyze and address soft tissue restrictions, analyze and cue movement patterns, analyze and modify body mechanics/ergonomics, address imbalance/dizziness and instruct in home and community integration to attain remaining goals.    Progress toward goals / Updated goals:    Pt making slow/min progress thus far towards goals     PLAN  []  Upgrade activities as tolerated yes Continue plan of care   []  Discharge due to :    []  Other:      Therapist: Marcia Uriarte PT    Date: 7/16/2018 Time: 5:34 PM     Future Appointments  Date Time Provider Amanda Ahn   7/18/2018 5:30 PM Marcia Uriarte PT [facility]   7/23/2018 5:30 PM Marcia Uriarte PT [facility]   7/25/2018 5:30 PM Marcia Uriarte PT [facility]   7/30/2018 5:30 PM Marcia Uriarte PT [facility]   8/2/2018 5:30  [facility]   8/7/2018 5:30  [facility]   8/9/2018 5:30 PM Traci De La Rosa Granda [facility] [address]   8/14/2018 5:30 PM [address] [place] [id number]   8/16/2018 5:30 PM [address] [place] [address]

## 2018-07-18 ENCOUNTER — HOSPITAL ENCOUNTER (OUTPATIENT)
Dept: PHYSICAL THERAPY | Age: 58
Discharge: HOME | End: 2018-07-18
Payer: COMMERCIAL

## 2018-07-18 PROCEDURE — 97110 THERAPEUTIC EXERCISES: CPT

## 2018-07-18 PROCEDURE — 97140 MANUAL THERAPY 1/> REGIONS: CPT

## 2018-07-18 NOTE — PROGRESS NOTES
PHYSICAL THERAPY - DAILY TREATMENT NOTE    Patient Name: Inga Blancas        Date: 2018  : 1960   yes Patient  Verified  Visit #:     Insurance: Payor: Jeni Wren / Plan: [facility] PPO / Product Type: PPO /      In time: 5:28 Out time: 6:22   Total Treatment Time: 54     Medicare/[facility] Time Tracking (below)   Total Timed Codes (min):  44 1:1 Treatment Time:  44     TREATMENT AREA =  Right hip pain [M25.551]    SUBJECTIVE  Pain Level (on 0 to 10 scale):  2.5  / 10   Medication Changes/New allergies or changes in medical history, any new surgeries or procedures?    no  If yes, update Summary List   Subjective Functional Status/Changes:  []  No changes reported     \"I think it is getting a little better. I notice the pain feels a little deeper and isn't quite so superficial anymore\"          OBJECTIVE  Modalities Rationale:     decrease inflammation and decrease pain to improve patient's ability to ambulate, stand   min [] Estim, type/location:                                      []  att     []  unatt     []  w/US     []  w/ice    []  w/heat    min []  Mechanical Traction: type/lbs                   []  pro   []  sup   []  int   []  cont    []  before manual    []  after manual    min []  Ultrasound, settings/location:      min []  Iontophoresis w/ dexamethasone, location:                                               []  take home patch       []  in clinic   10 min [x]  Ice     []  Heat    location/position:  To the R hip in supine w/ bolster    min []  Vasopneumatic Device, press/temp:     min []  Other:    [x] Skin assessment post-treatment (if applicable):    [x]  intact    []  redness- no adverse reaction     []redness  adverse reaction:        34 min Therapeutic Exercise:  [x]  See flow sheet   Rationale:      increase ROM and increase strength to improve the patients ability to ambulate, negotiate stairs     10 min Manual Therapy: stm to R glute med, piri, TFL, VL; long axis distraction to R hip   Rationale:      decrease pain, increase ROM, increase tissue extensibility and decrease trigger points to improve patient's ability to tolerate prolonged sitting     min Patient Education:  yes  Reviewed HEP   []  Progressed/Changed HEP based on: Other Objective/Functional Measures:    Significant ttp to prox VL  Cueing to prevent trendelenburg R w/ L standing abd     Post Treatment Pain Level (on 0 to 10) scale:   0  / 10     ASSESSMENT  Assessment/Changes in Function:     Pt reported no pain following tx session. Added step hip flexor str to HEP     []  See Progress Note/Recertification   Patient will continue to benefit from skilled PT services to modify and progress therapeutic interventions, address functional mobility deficits, address ROM deficits, address strength deficits, analyze and address soft tissue restrictions, analyze and cue movement patterns, analyze and modify body mechanics/ergonomics, address imbalance/dizziness and instruct in home and community integration to attain remaining goals.    Progress toward goals / Updated goals:    Assess FOTO/GROC at NV     PLAN  []  Upgrade activities as tolerated yes Continue plan of care   []  Discharge due to :    []  Other:      Therapist: Coni Valentine PT    Date: 7/18/2018 Time: 6:12 PM     Future Appointments  Date Time Provider Amanda Ahn   7/23/2018 5:30 PM Coni Valentine PT [facility]   7/25/2018 5:30 PM Coni Valentine PT [facility]   7/30/2018 5:30 PM Coni Valentine PT [facility]   8/2/2018 5:30  [facility]   8/7/2018 5:30  [facility]   8/9/2018 5:30  [facility]   8/14/2018 5:30  [facility]   8/16/2018 5:30 PM [facility]

## 2018-07-23 ENCOUNTER — HOSPITAL ENCOUNTER (OUTPATIENT)
Dept: PHYSICAL THERAPY | Age: 58
Discharge: HOME | End: 2018-07-23
Payer: COMMERCIAL

## 2018-07-23 PROCEDURE — 97110 THERAPEUTIC EXERCISES: CPT

## 2018-07-23 PROCEDURE — 97140 MANUAL THERAPY 1/> REGIONS: CPT

## 2018-07-23 NOTE — PROGRESS NOTES
PHYSICAL THERAPY - DAILY TREATMENT NOTE    Patient Name: Maged Olivera        Date: 2018  : 1960   yes Patient  Verified  Visit #:     Insurance: Payor: Paddyreagan Laws / Plan: [facility] PPO / Product Type: PPO /      In time: 5:30 Out time: 6:15   Total Treatment Time: 45     Medicare/[employer] Time Tracking (below)   Total Timed Codes (min):  35 1:1 Treatment Time:  35     TREATMENT AREA =  Right hip pain [M25.551]    SUBJECTIVE  Pain Level (on 0 to 10 scale):  2  / 10   Medication Changes/New allergies or changes in medical history, any new surgeries or procedures?    no  If yes, update Summary List   Subjective Functional Status/Changes:  []  No changes reported     \"I feel like we may be turning a corner\". Pt reports her overall pain level has been reducing, though does cont to c/o tension in the R hip musculature. OBJECTIVE  Modalities Rationale:     decrease inflammation and decrease pain to improve patient's ability to complete ADLs   min [] Estim, type/location:                                      []  att     []  unatt     []  w/US     []  w/ice    []  w/heat    min []  Mechanical Traction: type/lbs                   []  pro   []  sup   []  int   []  cont    []  before manual    []  after manual    min []  Ultrasound, settings/location:      min []  Iontophoresis w/ dexamethasone, location:                                               []  take home patch       []  in clinic   10 min [x]  Ice     []  Heat    location/position:  To the R hip in supine w/ bolster    min []  Vasopneumatic Device, press/temp:     min []  Other:    [] Skin assessment post-treatment (if applicable):    []  intact    []  redness- no adverse reaction     []redness  adverse reaction:        25 min Therapeutic Exercise:  [x]  See flow sheet   Rationale:      increase ROM, increase strength, improve coordination, improve balance and increase proprioception to improve the patients ability to ambulate     10 min Manual Therapy: stm to R glute med, tfl, prox rf; long axis hip distraction, supine hs str   Rationale:      decrease pain, increase ROM, increase tissue extensibility and decrease trigger points to improve patient's ability to transfer       min Patient Education:  yes  Reviewed HEP   []  Progressed/Changed HEP based on: Other Objective/Functional Measures:    Denied hs cramping during bridges this visit  Cont ttp to lateral hip musculature, dec to anterior  Cont to gain relief long axis distraction  FOTO 52/100  GROC +2     Post Treatment Pain Level (on 0 to 10) scale:   0  / 10     ASSESSMENT  Assessment/Changes in Function:     Pt reported dec pain post tx session. Demo improving functional mobility evident by 8 point inc in FOTO Score     []  See Progress Note/Recertification   Patient will continue to benefit from skilled PT services to modify and progress therapeutic interventions, address functional mobility deficits, address ROM deficits, address strength deficits, analyze and address soft tissue restrictions, analyze and cue movement patterns, analyze and modify body mechanics/ergonomics, assess and modify postural abnormalities and instruct in home and community integration to attain remaining goals.    Progress toward goals / Updated goals:    Progressing well to LTG #3     PLAN  []  Upgrade activities as tolerated yes Continue plan of care   []  Discharge due to :    []  Other:      Therapist: Isaiah Francois PT    Date: 7/23/2018 Time: 5:40 PM     Future Appointments  Date Time Provider Amanda Ahn   7/25/2018 5:30 PM Isaiah Francois PT [facility]   7/30/2018 5:30 PM Isaiah Francois, PT [facility]   8/2/2018 5:30  [facility]   8/9/2018 5:30  [facility]   8/10/2018 8:30 AM Deana Molina, PT [facility]   8/14/2018 5:30  [facility]   8/16/2018 5:30  [facility]

## 2018-07-25 ENCOUNTER — HOSPITAL ENCOUNTER (OUTPATIENT)
Dept: PHYSICAL THERAPY | Age: 58
Discharge: HOME | End: 2018-07-25
Payer: COMMERCIAL

## 2018-07-25 PROCEDURE — 97110 THERAPEUTIC EXERCISES: CPT

## 2018-07-25 PROCEDURE — 97140 MANUAL THERAPY 1/> REGIONS: CPT

## 2018-07-25 NOTE — PROGRESS NOTES
Amira Rayo [address]  [facility]ER[facility]  [address],[facility], [facility] - Phone: [phone]  Fax: [phone]  PROGRESS NOTE  Patient Name: Lakeshia Benavides : 1960   Treatment/Medical Diagnosis: Right hip pain [M25.551]   Referral Source: Humberto Boyd DO     Date of Initial Visit: 18 Attended Visits: 7 Missed Visits: 0     SUMMARY OF TREATMENT  Pt has attended 7 sessions of PT for the tx of R hip pain. PT tx has consisted of therex, manual tx, modalities prn, and hep in order to improve ROM, strength/stability, flexibility, and dec pain. CURRENT STATUS  Pt currently reports 75% overall improvement in sx, 3/10 max pain, 2/10 avg pain. Pt c/c is currently stiffness w/ static sitting > 15 minutes. Pt notes dec swelling sensation in the anterior hip/thigh. AROM hip flexion 105 deg. MMT hip flex 4/5 p!, abd 4-/5 p!. FOTO score 52/100, GROC +2 \"a little better\". Pt cont to ambulate w/ antalgic gait. Gains significant relief w/ long-axis hip distraction. Goal/Measure of Progress Goal Met? 1. Pt will demo >1 week symmetrical SIJ   Status at last Eval: R ant, L post, R sacral rotation Current Status: Cont w/ intermittent asymmetries no   2. Pt will note daily max pain </= 3/10 in order to inc participation in ADLs   Status at last Eval: 810 Current Status: 3/10 yes   3. Pt will inc FOTO by 9 points in order to show functional improvement   Status at last Eval: 44/100 Current Status: 52/100 progress     New Goals to be achieved in __4__  weeks:  1. Pt will tolerate 1 hour of sitting pain-free in order to improve work productivity  2. Pt will improve glute med strength to at least 4/5 to improve gait mechanics  3. Achieve LTG #3    RECOMMENDATIONS  Pt making steady progress towards goals thus far.  Recommend pt continue PT 2x/wk for additional 4 weeks in order to further improve upon R hip strength and dec pain in order to improve activity tolerance. Thank you . If you have any questions/comments please contact us directly at [phone]. Thank you for allowing us to assist in the care of your patient. Therapist Signature: Osorio Zamora PT Date: 7/25/2018     Time: 5:48 PM   NOTE TO PHYSICIAN:  PLEASE COMPLETE THE ORDERS BELOW AND FAX TO   [facility]: [phone]  If you are unable to process this request in 24 hours please contact our office: [phone]    ___ I have read the above report and request that my patient continue as recommended.   ___ I have read the above report and request that my patient continue therapy with the following changes/special instructions:_________________________________________________________   ___ I have read the above report and request that my patient be discharged from therapy.      Physician Signature:        Date:       Time:

## 2018-07-25 NOTE — PROGRESS NOTES
PHYSICAL THERAPY - DAILY TREATMENT NOTE    Patient Name: Alphonsus Epley        Date: 2018  : 1960   yes Patient  Verified  Visit #:     Insurance: Payor: BLUE CROSS / Plan: Josiah Garcia [id number] PPO / Product Type: PPO /      In time: 5:28 Out time: 6:13   Total Treatment Time: 45     Medicare/[facility] Time Tracking (below)   Total Timed Codes (min):  35 1:1 Treatment Time:  35     TREATMENT AREA =  Right hip pain [M25.551]    SUBJECTIVE  Pain Level (on 0 to 10 scale):  2  / 10   Medication Changes/New allergies or changes in medical history, any new surgeries or procedures?    no  If yes, update Summary List   Subjective Functional Status/Changes:  []  No changes reported     Pt reports she is feeling good today. Pt reports minimal pain if she is on the move but notes inc stiffness and pain after ~15 minutes of sitting. Pt reports 75% overall improvement in sx, 3/10 max pain, 2/10 avg pain. Pt notes swelling sensation in the anterior hip has reduced. OBJECTIVE  Modalities Rationale:     decrease inflammation and decrease pain to improve patient's ability to complete ADLs   min [] Estim, type/location:                                      []  att     []  unatt     []  w/US     []  w/ice    []  w/heat    min []  Mechanical Traction: type/lbs                   []  pro   []  sup   []  int   []  cont    []  before manual    []  after manual    min []  Ultrasound, settings/location:      min []  Iontophoresis w/ dexamethasone, location:                                               []  take home patch       []  in clinic   10 min [x]  Ice     []  Heat    location/position:  To the R hip in supine w/ bolster    min []  Vasopneumatic Device, press/temp:     min []  Other:    [x] Skin assessment post-treatment (if applicable):    [x]  intact    []  redness- no adverse reaction     []redness  adverse reaction:        25 min Therapeutic Exercise:  [x]  See flow sheet   Rationale:      increase ROM, increase strength, improve coordination and improve balance to improve the patients ability to ambulate     10 min Manual Therapy: stm to R piriformis, glute max in prone, hip IR/ER str in prone, long axis distraction   Rationale:      decrease pain, increase ROM, increase tissue extensibility and decrease trigger points to improve patient's ability to ambulate     min Patient Education:  yes  Reviewed HEP   []  Progressed/Changed HEP based on: Other Objective/Functional Measures:    25' 1:1 TE    AROM hip flexion 102 deg  MMT hip flex 4/5 p!, abd 4-/5 p! Post Treatment Pain Level (on 0 to 10) scale:   2  / 10     ASSESSMENT  Assessment/Changes in Function:     See PN     [x]  See Progress Note/Recertification   Patient will continue to benefit from skilled PT services to modify and progress therapeutic interventions, address functional mobility deficits, address ROM deficits, address strength deficits, analyze and address soft tissue restrictions, analyze and cue movement patterns, analyze and modify body mechanics/ergonomics, assess and modify postural abnormalities, address imbalance/dizziness and instruct in home and community integration to attain remaining goals.    Progress toward goals / Updated goals:    See PN     PLAN  []  Upgrade activities as tolerated yes Continue plan of care   []  Discharge due to :    []  Other:      Therapist: Isaiah Francois PT    Date: 7/25/2018 Time: 5:38 PM     Future Appointments  Date Time Provider Amanda Ahn   7/30/2018 5:30 PM Isaiah Francois PT [facility]   8/2/2018 5:30  [facility]   8/9/2018 5:30  [facility]   8/10/2018 8:30 AM Michaela Miranda PT [facility]   8/14/2018 5:30  [facility]   8/16/2018 5:30  [facility]

## 2018-07-30 ENCOUNTER — HOSPITAL ENCOUNTER (OUTPATIENT)
Dept: PHYSICAL THERAPY | Age: 58
Discharge: HOME | End: 2018-07-30
Payer: COMMERCIAL

## 2018-07-30 PROCEDURE — 97110 THERAPEUTIC EXERCISES: CPT

## 2018-07-30 PROCEDURE — 97140 MANUAL THERAPY 1/> REGIONS: CPT

## 2018-07-30 NOTE — PROGRESS NOTES
PHYSICAL THERAPY - DAILY TREATMENT NOTE Patient Name: Sanjuanita Baer        Date: 2018 : 1960   yes Patient  Verified Visit #:   8   of   12 (+8)  Insurance: Payor: BLUE CROSS / Plan: [facility] PPO / Product Type: PPO / In time: 5:31 Out time: 6:26 Total Treatment Time: 54 Medicare/[employer] Time Tracking (below) Total Timed Codes (min):  45 1:1 Treatment Time:  45 TREATMENT AREA =  Right hip pain [M25.551] SUBJECTIVE Pain Level (on 0 to 10 scale):  2  / 10 Medication Changes/New allergies or changes in medical history, any new surgeries or procedures?    no  If yes, update Summary List  
Subjective Functional Status/Changes:  []  No changes reported Pt reports inc soreness due to moving bedroom furniture this weekend and \"over-doing it\". Pt feels the pain is more lower back relates vs R hip however OBJECTIVE Modalities Rationale:     decrease inflammation and decrease pain to improve patient's ability to ambulate 
 min [] Estim, type/location:    
                                 []  att     []  unatt     []  w/US     []  w/ice    []  w/heat  
 min []  Mechanical Traction: type/lbs   
               []  pro   []  sup   []  int   []  cont    []  before manual    []  after manual  
 min []  Ultrasound, settings/location:    
 min []  Iontophoresis w/ dexamethasone, location:   
                                           []  take home patch       []  in clinic  
10 min [x]  Ice     []  Heat    location/position: To the R hip in supine w/ bolster  
 min []  Vasopneumatic Device, press/temp:   
 min []  Other:   
[x] Skin assessment post-treatment (if applicable):   
[x]  intact    []  redness- no adverse reaction    
[]redness  adverse reaction:     
 
30 min Therapeutic Exercise:  [x]  See flow sheet Rationale:      increase ROM, increase strength and improve coordination to improve the patients ability to ambulate, transfer 15 min Manual Therapy: STM to the R piriformis, glute max, glute med in prone; CFM to R post sacroiliac ligament, piriformis str in supine R, long axis distraction R LE, shotgun tech Rationale:      decrease pain, increase ROM, increase tissue extensibility and decrease trigger points to improve patient's ability to complete ADLs 
 
 
 min Patient Education:  yes  Reviewed HEP []  Progressed/Changed HEP based on: Other Objective/Functional Measures: 
 
Sig ttp over R post si ligament and piriformis Noted R post innominate rotation corrected w/ shotgun tech Performed standing hip abd w/ L LE on 2\" step due challenged to clear R LE from floor and c/o \"unlevel pelvis\" Heavy cueing for minisquat technique Post Treatment Pain Level (on 0 to 10) scale:   2  / 10 ASSESSMENT Assessment/Changes in Function: Tolerated inc in reps/resistance per flow w/o c/o inc pain. []  See Progress Note/Recertification Patient will continue to benefit from skilled PT services to modify and progress therapeutic interventions, address functional mobility deficits, address ROM deficits, address strength deficits, analyze and address soft tissue restrictions, analyze and cue movement patterns, analyze and modify body mechanics/ergonomics, address imbalance/dizziness and instruct in home and community integration to attain remaining goals. Progress toward goals / Updated goals: No significant progress made to LTGs this visit PLAN 
[]  Upgrade activities as tolerated yes Continue plan of care  
[]  Discharge due to :   
[]  Other:   
 
Therapist: Elliot Gardner, PT Date: 7/30/2018 Time: 5:36 PM  
 
Future Appointments Date Time Provider Amanda Ahn 8/2/2018 5:30 PM [address] [place] [address]  
8/9/2018 5:30 PM Neela Hines [facility]  
8/10/2018 8:30 AM James Kennedy PT [facility]  
8/14/2018 5:30 PM [address] [place] [address]  
8/16/2018 5:30 PM [address] [place] [address]

## 2018-08-02 ENCOUNTER — HOSPITAL ENCOUNTER (OUTPATIENT)
Dept: PHYSICAL THERAPY | Age: 58
Discharge: HOME | End: 2018-08-02
Payer: COMMERCIAL

## 2018-08-02 PROCEDURE — 97140 MANUAL THERAPY 1/> REGIONS: CPT

## 2018-08-02 PROCEDURE — 97110 THERAPEUTIC EXERCISES: CPT

## 2018-08-02 NOTE — PROGRESS NOTES
PHYSICAL THERAPY - DAILY TREATMENT NOTE Patient Name: Marie Matthews        Date: 2018 : 1960   yes Patient  Verified Visit #:     Insurance: Payor: BLUE CROSS / Plan: [facility] PPO / Product Type: PPO / In time: 5:30 Out time: 6:30 Total Treatment Time: 60 Medicare/[facility] Time Tracking (below) Total Timed Codes (min):  50 1:1 Treatment Time:  45 TREATMENT AREA =  Right hip pain [M25.551] SUBJECTIVE Pain Level (on 0 to 10 scale):  1-2 / 10 Medication Changes/New allergies or changes in medical history, any new surgeries or procedures?    no  If yes, update Summary List  
Subjective Functional Status/Changes:  []  No changes reported \"My body feels more leveled after getting a new mattress about a week ago. I'm still not able to sleep through the night, but I'm able to sleep on my back. I'm also planning on possibly getting a standing desk because I feel like sitting is part of the problem. \" OBJECTIVE Modalities Rationale:     decrease inflammation and decrease pain to improve patient's ability to ambulate 
 min [] Estim, type/location:    
                                 []  att     []  unatt     []  w/US     []  w/ice    []  w/heat  
 min []  Mechanical Traction: type/lbs   
               []  pro   []  sup   []  int   []  cont    []  before manual    []  after manual  
 min []  Ultrasound, settings/location:    
 min []  Iontophoresis w/ dexamethasone, location:   
                                           []  take home patch       []  in clinic  
10 min [x]  Ice     []  Heat    location/position:  R hip in supine with wedge post-session  
 min []  Vasopneumatic Device, press/temp:   
 min []  Other:   
[x] Skin assessment post-treatment (if applicable):   
[x]  intact    []  redness- no adverse reaction    
[]redness  adverse reaction:     
 
35/40 min Therapeutic Exercise:  [x]  See flow sheet Rationale:      increase ROM, increase strength and improve coordination to improve the patients ability to perform walking and transfer activities 10 min Manual Therapy: STM to the R piriformis, glute max, glute med in L sidelying; IR/ER stretch to R hip Rationale:      decrease pain, increase ROM, increase tissue extensibility and decrease trigger points to improve patient's ability to complete ADLs 
 
 
 min Patient Education:  yes  Reviewed HEP []  Progressed/Changed HEP based on: Other Objective/Functional Measures: 
 
1:1 TE + MT = 45' TTP along glute med and piriformis during manual therapy Added glute med/piriformis stretch Increased repetitions on deadbugs, clams, and standing hip abduction Demonstrates improved technique during minisquats, however continues to require min cues for hip hinging Post Treatment Pain Level (on 0 to 10) scale:   1  / 10 ASSESSMENT Assessment/Changes in Function:  
 
Demonstrated good tolerance with progression of exercises; denies sharp pain or red flags during exercises. Noted slight relief of tightness along glute med following stretch. Continues to have good pain relief following CP to R hip 
  
[]  See Progress Note/Recertification Patient will continue to benefit from skilled PT services to modify and progress therapeutic interventions, address functional mobility deficits, address ROM deficits, address strength deficits, analyze and address soft tissue restrictions, analyze and cue movement patterns, analyze and modify body mechanics/ergonomics, address imbalance/dizziness and instruct in home and community integration to attain remaining goals. Progress toward goals / Updated goals: 
 
Slow progress towards LTG #2 PLAN 
[]  Upgrade activities as tolerated yes Continue plan of care  
[]  Discharge due to :   
[]  Other:   
 
Therapist: JORDAN Huddleston Date: 8/2/2018 Time: 7:24 PM  
 
Future Appointments Date Time Provider Amanda Ahn 8/2/2018 5:30 PM Ignacia Amador [facility]  
8/8/2018 8:00 AM Mahnaz Aggarwal, PT [facility]  
8/10/2018 8:30 AM Yolande Hernandez, PT [facility]  
8/14/2018 5:30 PM Haynes Ormond [facility]  
8/16/2018 5:30 PM Haynes Ormond [facility]

## 2018-08-07 ENCOUNTER — APPOINTMENT (OUTPATIENT)
Dept: PHYSICAL THERAPY | Age: 58
End: 2018-08-07
Payer: COMMERCIAL

## 2018-08-08 ENCOUNTER — HOSPITAL ENCOUNTER (OUTPATIENT)
Dept: PHYSICAL THERAPY | Age: 58
Discharge: HOME | End: 2018-08-08
Payer: COMMERCIAL

## 2018-08-08 PROCEDURE — 97140 MANUAL THERAPY 1/> REGIONS: CPT

## 2018-08-08 PROCEDURE — 97110 THERAPEUTIC EXERCISES: CPT

## 2018-08-08 NOTE — PROGRESS NOTES
PHYSICAL THERAPY - DAILY TREATMENT NOTE    Patient Name: Iliana Gonzalez        Date: 2018  : 1960   yes Patient  Verified  Visit #:   10   of   20  Insurance: Payor: BLUE CROSS / Plan: [facility] PPO / Product Type: PPO /      In time: 8:02 Out time: 8:49   Total Treatment Time: 47     Medicare/[employer] Time Tracking (below)   Total Timed Codes (min): 47 1:1 Treatment Time:  35     TREATMENT AREA =  Right hip pain [M25.551]    SUBJECTIVE  Pain Level (on 0 to 10 scale):  1  / 10   Medication Changes/New allergies or changes in medical history, any new surgeries or procedures?    no  If yes, update Summary List   Subjective Functional Status/Changes:  []  No changes reported     Pt reports her new mattress seems to be helping her hip. Reports she continues to feel swollen in the front of the hip/thigh. MD [place] tomorrow          OBJECTIVE    37 min Therapeutic Exercise:  [x]  See flow sheet   Rationale:      increase ROM and increase strength to improve the patients ability to tolerate prolonged sitting, ambulating    10 min Manual Therapy: DTM/TPR to the R RF, TFL, glute med, piriformis, long axis hip distraction   Rationale:      decrease pain, increase ROM, increase tissue extensibility and decrease trigger points to improve patient's ability to transfer       min Patient Education:  yes  Reviewed HEP   []  Progressed/Changed HEP based on:        Other Objective/Functional Measures:    Sig ttp noted to the R prox RF and TFL, min ttp to lateral hip musculature  Cueing to improve hip ext ROM during supine bridges       Post Treatment Pain Level (on 0 to 10) scale:   1  / 10     ASSESSMENT  Assessment/Changes in Function:     No inc in pain reported post tx session     []  See Progress Note/Recertification   Patient will continue to benefit from skilled PT services to modify and progress therapeutic interventions, address functional mobility deficits, address ROM deficits, address strength deficits, analyze and address soft tissue restrictions, analyze and cue movement patterns, analyze and modify body mechanics/ergonomics, assess and modify postural abnormalities and instruct in home and community integration to attain remaining goals.    Progress toward goals / Updated goals:    Progressing slowly to LTG #2     PLAN  []  Upgrade activities as tolerated yes Continue plan of care   []  Discharge due to :    []  Other:      Therapist: Isaiah Francois PT    Date: 8/8/2018 Time: 8:12 AM     Future Appointments  Date Time Provider Amanda Ahn   8/10/2018 8:30 AM Yolande Hernandez PT [facility]   8/14/2018 5:30 PM Ignacia Amador [facility]   8/16/2018 5:30 PM [address] [facility]

## 2018-08-09 ENCOUNTER — APPOINTMENT (OUTPATIENT)
Dept: PHYSICAL THERAPY | Age: 58
End: 2018-08-09
Payer: COMMERCIAL

## 2018-08-10 ENCOUNTER — HOSPITAL ENCOUNTER (OUTPATIENT)
Dept: PHYSICAL THERAPY | Age: 58
Discharge: HOME | End: 2018-08-10
Payer: COMMERCIAL

## 2018-08-10 PROCEDURE — 97140 MANUAL THERAPY 1/> REGIONS: CPT | Performed by: PHYSICAL THERAPIST

## 2018-08-10 NOTE — PROGRESS NOTES
[facility] PHYSICAL THERAPY - DAILY TREATMENT NOTE    Patient Name: Mckinley Goodpasture        Date: 8/10/2018  : 1960   yes Patient  Verified  Visit #:     Insurance: Payor: BLUE CROSS / Plan: [facility] PPO / Product Type: PPO /      In time: 840 Out time: 930   Total Treatment Time: 50     Medicare/[employer] Time Tracking (below)   Total Timed Codes (min):  50 1:1 Treatment Time:  15     TREATMENT AREA =  Right hip pain [M25.551]    SUBJECTIVE  Pain Level (on 0 to 10 scale):  1  / 10   Medication Changes/New allergies or changes in medical history, any new surgeries or procedures?    no  If yes, update Summary List   Subjective Functional Status/Changes:  []  No changes reported     I went and saw dr Soco Salas and he said that my mobility and flexibility looked much better. I still have some sij instability though and he popped that back into place          OBJECTIVE      35 min Therapeutic Exercise:  [x]  See flow sheet   Rationale:      increase ROM and increase strength to improve the patients ability to complete adls     15 min Manual Therapy: Mfr/stm sacral border, R GM/piri release, hip ir/er stretch in prone    Rationale:      decrease pain, increase ROM, increase tissue extensibility and decrease trigger points to improve patient's ability to complete adls        min Patient Education:  yes  Reviewed HEP   []  Progressed/Changed HEP based on:        Other Objective/Functional Measures:    1:1 mt only  ttp along R sacral border based>apex with reduced hip ir   Hip drop noted with all glute med exercises      Post Treatment Pain Level (on 0 to 10) scale:   1  / 10     ASSESSMENT  Assessment/Changes in Function:     Continues with significant core and hip weakness contributing ot sij instability      []  See Progress Note/Recertification   Patient will continue to benefit from skilled PT services to modify and progress therapeutic interventions, address functional mobility deficits, address ROM deficits, address strength deficits, analyze and address soft tissue restrictions, analyze and cue movement patterns and analyze and modify body mechanics/ergonomics to attain remaining goals.    Progress toward goals / Updated goals:    Steady progress towards pain reduction      PLAN  []  Upgrade activities as tolerated yes Continue plan of care   []  Discharge due to :    []  Other:      Therapist: Candelario Mendiola PT    Date: 8/10/2018 Time: 9:35 AM     Future Appointments  Date Time Provider Amanda hAn   8/14/2018 5:30  [facility]   8/16/2018 5:30  [facility]

## 2018-08-14 ENCOUNTER — HOSPITAL ENCOUNTER (OUTPATIENT)
Dept: PHYSICAL THERAPY | Age: 58
Discharge: HOME | End: 2018-08-14
Payer: COMMERCIAL

## 2018-08-14 PROCEDURE — 97140 MANUAL THERAPY 1/> REGIONS: CPT

## 2018-08-14 NOTE — PROGRESS NOTES
PHYSICAL THERAPY - DAILY TREATMENT NOTE    Patient Name: Edward Jameson        Date: 2018  : 1960   yes Patient  Verified  Visit #:     Insurance: Payor: BLUE CROSS / Plan: [facility] PPO / Product Type: PPO /      In time: 5:32 Out time: 6:28   Total Treatment Time: 56     Medicare/[employer] Time Tracking (below)   Total Timed Codes (min):  46 1:1 Treatment Time:  15     TREATMENT AREA =  Right hip pain [M25.551]    SUBJECTIVE  Pain Level (on 0 to 10 scale): 1 / 10   Medication Changes/New allergies or changes in medical history, any new surgeries or procedures?    no  If yes, update Summary List   Subjective Functional Status/Changes:  []  No changes reported     Patient reports she has been moving from her home into a condo and has been very stressed out from it. States that she has been \"good\" about avoiding doing \"too much\" such as lifting or carrying heavy items.         OBJECTIVE  Modalities Rationale:     decrease inflammation and decrease pain to improve patient's ability to ambulate   min [] Estim, type/location:                                      []  att     []  unatt     []  w/US     []  w/ice    []  w/heat    min []  Mechanical Traction: type/lbs                   []  pro   []  sup   []  int   []  cont    []  before manual    []  after manual    min []  Ultrasound, settings/location:      min []  Iontophoresis w/ dexamethasone, location:                                               []  take home patch       []  in clinic   10 min [x]  Ice     []  Heat    location/position:  R hip in supine with wedge post-session    min []  Vasopneumatic Device, press/temp:     min []  Other:    [x] Skin assessment post-treatment (if applicable):    [x]  intact    []  redness- no adverse reaction     []redness  adverse reaction:        /36 min Therapeutic Exercise:  [x]  See flow sheet   Rationale:      increase ROM, increase strength and improve coordination to improve the patients ability to perform walking and transfer activities     10 min Manual Therapy: STM to the R piriformis, glute max, glute med in L sidelying; IR/ER stretch to R hip    Rationale:      decrease pain, increase ROM, increase tissue extensibility and decrease trigger points to improve patient's ability to complete ADLs       min Patient Education:  yes  Reviewed HEP   []  Progressed/Changed HEP based on: Other Objective/Functional Measures:    1:1 TE + MT = 15'    Increase resistance for S/L clams     Post Treatment Pain Level (on 0 to 10) scale:   1  / 10     ASSESSMENT  Assessment/Changes in Function:     Demonstrated good tolerance with therex; denies sharp pain or red flags in R hip during PT session. []  See Progress Note/Recertification   Patient will continue to benefit from skilled PT services to modify and progress therapeutic interventions, address functional mobility deficits, address ROM deficits, address strength deficits, analyze and address soft tissue restrictions, analyze and cue movement patterns, analyze and modify body mechanics/ergonomics, address imbalance/dizziness and instruct in home and community integration to attain remaining goals. Progress toward goals / Updated goals: Will continue to progress therex per patient's tolerance   PN NV?   Good progress towards LTG #2       PLAN  []  Upgrade activities as tolerated yes Continue plan of care   []  Discharge due to :    []  Other:      Therapist: JORDAN Baker    Date: 8/14/2018 Time: 7:12 PM     Future Appointments  Date Time Provider Amanda Ahn   8/14/2018 5:30 PM Amada Atwood [facility]   8/16/2018 5:30 PM Amada Atwood [facility]

## 2018-08-16 ENCOUNTER — HOSPITAL ENCOUNTER (OUTPATIENT)
Dept: PHYSICAL THERAPY | Age: 58
Discharge: HOME | End: 2018-08-16
Payer: COMMERCIAL

## 2018-08-16 PROCEDURE — 97140 MANUAL THERAPY 1/> REGIONS: CPT

## 2018-08-16 PROCEDURE — 97110 THERAPEUTIC EXERCISES: CPT

## 2018-08-16 NOTE — PROGRESS NOTES
41 [facility] [address] [place] [address] THERAPY  [address] [place], 45 [facility], [place], 70 [facility] - Phone: [phone]  Fax: [phone]  DISCHARGE SUMMARY  Patient Name: Virginie Yanez : 1960   Treatment/Medical Diagnosis: Right hip pain [M25.551]   Referral Source: Jamison Alex DO     Date of Initial Visit: 18 Attended Visits: 13 Missed Visits: 0     SUMMARY OF TREATMENT  Patient attended 13 PT sessions for her R hip pain. PT treatment has consisted of therapeutic exercises, manual therapy, patient education, and home exercise program to improve R hip ROM, strength, flexibility, and endurance. Modalities for pain control. CURRENT STATUS  [place] reports 90% overall improvement since beginning physical therapy. States min pain 1/10, avg pain 2/10, and max pain 3/10 during prolonged sitting activities (>15 minutes). Denies swelling sensation in anterior hip/thigh, however continues to have tightness and discomfort in the R hip. Able to decrease symptoms with stretches and rest. Noted decrease in FOTO score to 48, however noted +6 (a great deal better) on the global rating of change (GROC) denoting overall functional improvement. Goal/Measure of Progress Goal Met? 1. Pt will tolerate 1 hour of sitting pain-free in order to improve work productivity   Status at last Eval: 15 minutes Current Status: 15 minutes No change   2. Pt will improve glute med strength to at least 4/5 to improve gait mechanics   Status at last Eval: flex 4/5 p!  abd 4-/5 p!.  Current Status: flex 4/5  abd 4/5 yes   3. Pt will inc FOTO by 9 points in order to show functional improvement   Status at last Eval: 57 Current Status: 48 *may not be a true indicator of functional improvement due to subjective statement* Not met     RECOMMENDATIONS  Discontinue therapy. Progressing towards or have reached established goals.     If you have any questions/comments please contact us directly at [phone]. Thank you for allowing us to assist in the care of your patient.     Therapist Signature: JORDAN Fields Date: 10/3/18     Time: 4:53 PM

## 2018-08-16 NOTE — PROGRESS NOTES
PHYSICAL THERAPY - DAILY TREATMENT NOTE    Patient Name: Lakesha Dacosta        Date: 2018  : 1960   yes Patient  Verified  Visit #:   15   of   20  Insurance: Payor: [employer]UE [employer]OSS / Plan: [facility] PPO / Product Type: PPO /      In time: 5:42 Out time: 6:30   Total Treatment Time: 48     Medicare/[facility] Time Tracking (below)   Total Timed Codes (min):  38 1:1 Treatment Time:  25     TREATMENT AREA =  Right hip pain [M25.551]    SUBJECTIVE  Pain Level (on 0 to 10 scale): 1 / 10   Medication Changes/New allergies or changes in medical history, any new surgeries or procedures?    no  If yes, update Summary List   Subjective Functional Status/Changes:  []  No changes reported     Patient reports 90% overall improvement since beginning physical therapy; states that she does not have pain, however notes more \"discomfort and tightness/stiffness\" in R hip. Noted c/o discomfort with sitting > 15 minutes.      SEE D/C     OBJECTIVE  Modalities Rationale:     decrease inflammation and decrease pain to improve patient's ability to ambulate   min [] Estim, type/location:                                      []  att     []  unatt     []  w/US     []  w/ice    []  w/heat    min []  Mechanical Traction: type/lbs                   []  pro   []  sup   []  int   []  cont    []  before manual    []  after manual    min []  Ultrasound, settings/location:      min []  Iontophoresis w/ dexamethasone, location:                                               []  take home patch       []  in clinic   10 min [x]  Ice     []  Heat    location/position: R hip in L sidelying with wedge post-session    min []  Vasopneumatic Device, press/temp:     min []  Other:    [x] Skin assessment post-treatment (if applicable):    [x]  intact    []  redness- no adverse reaction     []redness  adverse reaction:        15/ min Therapeutic Exercise:  [x]  See flow sheet   Rationale:      increase ROM, increase strength and improve coordination to improve the patients ability to perform walking and transfer activities     10 min Manual Therapy: STM to the R piriformis, glute max, glute med in L sidelying; IR/ER stretch to R hip    Rationale:      decrease pain, increase ROM, increase tissue extensibility and decrease trigger points to improve patient's ability to complete ADLs       min Patient Education:  yes  Reviewed HEP   []  Progressed/Changed HEP based on: Other Objective/Functional Measures:    1:1 TE + MT = 25'    FOTO: 48  GROC: +6    MMT flex and abd: 4/5     SEE D/C     Post Treatment Pain Level (on 0 to 10) scale:    1 / 10     ASSESSMENT  Assessment/Changes in Function:     SEE D/C     []  See Progress Note/Recertification   Patient will continue to benefit from skilled PT services to modify and progress therapeutic interventions, address functional mobility deficits, address ROM deficits, address strength deficits, analyze and address soft tissue restrictions, analyze and cue movement patterns, analyze and modify body mechanics/ergonomics, address imbalance/dizziness and instruct in home and community integration to attain remaining goals. Progress toward goals / Updated goals:    SEE D/C       PLAN  []  Upgrade activities as tolerated no Continue plan of care   [x]  Discharge due to : Met or progressing towards all PT goals   []  Other:      Therapist: JORDAN Carson    Date: 8/16/2018 Time: 7:24 PM     No future appointments.

## 2018-12-11 ENCOUNTER — APPOINTMENT (OUTPATIENT)
Dept: PHYSICAL THERAPY | Age: 58
End: 2018-12-11

## 2022-04-02 ASSESSMENT — VISUAL ACUITY
OS_SC: 20/20
OS_SC: 20/25
OD_SC: 20/20
OS_SC: 20/20
OD_SC: 20/20
OD_SC: 20/20

## 2022-04-02 ASSESSMENT — TONOMETRY
OD_IOP_MMHG: 16
OD_IOP_MMHG: 15
OS_IOP_MMHG: 15
OS_IOP_MMHG: 16